# Patient Record
Sex: FEMALE | URBAN - METROPOLITAN AREA
[De-identification: names, ages, dates, MRNs, and addresses within clinical notes are randomized per-mention and may not be internally consistent; named-entity substitution may affect disease eponyms.]

---

## 2017-01-19 ENCOUNTER — APPOINTMENT (RX ONLY)
Dept: URBAN - METROPOLITAN AREA CLINIC 156 | Facility: CLINIC | Age: 27
Setting detail: DERMATOLOGY
End: 2017-01-19

## 2017-03-30 ENCOUNTER — APPOINTMENT (RX ONLY)
Dept: URBAN - METROPOLITAN AREA CLINIC 156 | Facility: CLINIC | Age: 27
Setting detail: DERMATOLOGY
End: 2017-03-30

## 2017-03-30 DIAGNOSIS — Z41.9 ENCOUNTER FOR PROCEDURE FOR PURPOSES OTHER THAN REMEDYING HEALTH STATE, UNSPECIFIED: ICD-10-CM

## 2017-03-30 PROCEDURE — ? COSMETIC CONSULTATION: HAIR REMOVAL

## 2017-03-30 PROCEDURE — ? LASER HAIR REMOVAL

## 2017-03-30 NOTE — PROCEDURE: LASER HAIR REMOVAL
Total Pulses: 3
Treatment Number: 0
Spot Size: 1.5 mm
Spot Size: 18 mm
Device Serial Number (Optional): [id number]
Pulse Duration: 0.45 ms
Pre-Procedure: Prior to proceeding the treatment areas were cleaned and all present put on their eye protection.
Pulse Duration: 10 ms
Spot Size: 12 mm
Post-Care Instructions: I reviewed with the patient in detail post-care instructions. Patient should avoid sun for a minimum of 4 weeks before and after treatment.
Cooling: DCD 40/30
Fluence (Will Not Render If 0): 10
Post-Procedure Care: Immediate endpoint: perifollicular erythema and edema. Laser Gel or Extreme Protect were applied post laser. Post care reviewed with patient.
Fluence (Will Not Render If 0): 22
Location Override: Test spot behind ear
Pulse Duration: 3 ms
Consent: Written consent obtained, risks reviewed including but not limited to crusting, scabbing, blistering, scarring, darker or lighter pigmentary change, paradoxical hair regrowth, incomplete removal of hair and infection.
Cooling Override: 16
Render Post-Care In The Note: No
Fluence (Will Not Render If 0): 20
Laser Type: Nd:Yag 1064nm
Detail Level: Generalized
Cooling: DCD setting
Fluence (Will Not Render If 0): 20

## 2017-04-03 ENCOUNTER — APPOINTMENT (RX ONLY)
Dept: URBAN - METROPOLITAN AREA CLINIC 156 | Facility: CLINIC | Age: 27
Setting detail: DERMATOLOGY
End: 2017-04-03

## 2017-04-03 DIAGNOSIS — Z41.9 ENCOUNTER FOR PROCEDURE FOR PURPOSES OTHER THAN REMEDYING HEALTH STATE, UNSPECIFIED: ICD-10-CM

## 2017-04-03 PROCEDURE — ? LASER HAIR REMOVAL

## 2017-04-03 NOTE — PROCEDURE: LASER HAIR REMOVAL
Spot Size: 1.5 mm
Fluence (Will Not Render If 0): 22
Number Of Prepaid Treatments (Will Not Render If 0): 0
Device Serial Number (Optional): [id number]
Number Of Prepaid Treatments (Will Not Render If 0): 5
Post-Procedure Care: Immediate endpoint: perifollicular erythema and edema. Laser Gel or Extreme Protect were applied post laser. Post care reviewed with patient.
Treatment Number: 1
Pulse Duration: 0.45 ms
Fluence (Will Not Render If 0): 20
Cooling: DCD 40/20
Fluence (Will Not Render If 0): 20
Fluence (Will Not Render If 0): 10
Spot Size: 18 mm
Cooling: DCD setting
Consent: Written consent obtained, risks reviewed including but not limited to crusting, scabbing, blistering, scarring, darker or lighter pigmentary change, paradoxical hair regrowth, incomplete removal of hair and infection.
Pulse Duration: 10 ms
Cooling: DCD 40/30
Pre-Procedure: Prior to proceeding the treatment areas were cleaned and all present put on their eye protection.
Laser Type: Nd:Yag 1064nm
Pulse Duration: 3 ms
Total Pulses: 36
Post-Care Instructions: I reviewed with the patient in detail post-care instructions. Patient should avoid sun for a minimum of 4 weeks before and after treatment.
Cooling Override: 16
Detail Level: Generalized
Render Post-Care In The Note: No
Spot Size: 12 mm

## 2017-05-03 ENCOUNTER — APPOINTMENT (RX ONLY)
Dept: URBAN - METROPOLITAN AREA CLINIC 156 | Facility: CLINIC | Age: 27
Setting detail: DERMATOLOGY
End: 2017-05-03

## 2017-05-03 DIAGNOSIS — Z41.9 ENCOUNTER FOR PROCEDURE FOR PURPOSES OTHER THAN REMEDYING HEALTH STATE, UNSPECIFIED: ICD-10-CM

## 2017-05-03 PROCEDURE — ? LASER HAIR REMOVAL

## 2017-05-03 NOTE — PROCEDURE: LASER HAIR REMOVAL
Cooling: DCD setting
Spot Size: 1.5 mm
Detail Level: Generalized
Render Post-Care In The Note: No
Anesthesia Volume In Cc: 0
Cooling Override: 16
Fluence (Will Not Render If 0): 20
Laser Type: Nd:Yag 1064nm
Fluence (Will Not Render If 0): 20
Cooling: DCD 40/30
Pulse Duration: 0.45 ms
Fluence (Will Not Render If 0): 10
Treatment Number: 2
Post-Procedure Care: Immediate endpoint: perifollicular erythema and edema. Laser Gel or Extreme Protect were applied post laser. Post care reviewed with patient.
Pre-Procedure: Prior to proceeding the treatment areas were cleaned and all present put on their eye protection.
Consent: Written consent obtained, risks reviewed including but not limited to crusting, scabbing, blistering, scarring, darker or lighter pigmentary change, paradoxical hair regrowth, incomplete removal of hair and infection.
Fluence (Will Not Render If 0): 22
Cooling: DCD 40/20
Total Pulses: 41
Pulse Duration: 3 ms
Device Serial Number (Optional): [id number]
Spot Size: 18 mm
Post-Care Instructions: I reviewed with the patient in detail post-care instructions. Patient should avoid sun for a minimum of 4 weeks before and after treatment.
Spot Size: 12 mm

## 2017-05-31 ENCOUNTER — APPOINTMENT (RX ONLY)
Dept: URBAN - METROPOLITAN AREA CLINIC 156 | Facility: CLINIC | Age: 27
Setting detail: DERMATOLOGY
End: 2017-05-31

## 2017-05-31 DIAGNOSIS — Z41.9 ENCOUNTER FOR PROCEDURE FOR PURPOSES OTHER THAN REMEDYING HEALTH STATE, UNSPECIFIED: ICD-10-CM

## 2017-05-31 PROCEDURE — ? LASER HAIR REMOVAL

## 2017-05-31 NOTE — PROCEDURE: LASER HAIR REMOVAL
External Cooling Fan Speed: 0
Fluence (Will Not Render If 0): 22
Fluence (Will Not Render If 0): 20
Spot Size: 18 mm
Pulse Duration: 0.45 ms
Post-Procedure Care: Immediate endpoint: perifollicular erythema and edema. Laser Gel or Extreme Protect were applied post laser. Post care reviewed with patient.
Spot Size: 1.5 mm
Pre-Procedure: Prior to proceeding the treatment areas were cleaned and all present put on their eye protection.
Detail Level: Generalized
Cooling: DCD setting
Fluence (Will Not Render If 0): 20
Cooling: DCD 40/30
Post-Care Instructions: I reviewed with the patient in detail post-care instructions. Patient should avoid sun for a minimum of 4 weeks before and after treatment.
Treatment Number: 3
Laser Type: Nd:Yag 1064nm
Cooling Override: 16
Pulse Duration: 10 ms
Render Post-Care In The Note: No
Pulse Duration: 3 ms
Fluence (Will Not Render If 0): 12
Device Serial Number (Optional): [id number]
Spot Size: 12 mm
Total Pulses: 23
Consent: Written consent obtained, risks reviewed including but not limited to crusting, scabbing, blistering, scarring, darker or lighter pigmentary change, paradoxical hair regrowth, incomplete removal of hair and infection.